# Patient Record
Sex: MALE | Race: BLACK OR AFRICAN AMERICAN | NOT HISPANIC OR LATINO | URBAN - NONMETROPOLITAN AREA
[De-identification: names, ages, dates, MRNs, and addresses within clinical notes are randomized per-mention and may not be internally consistent; named-entity substitution may affect disease eponyms.]

---

## 2019-12-18 NOTE — PATIENT DISCUSSION
Recommended artificial tears to use: 1 drop 4x a day in both eyes.  ed lubricate first thing in am as K tissue is dehydrated and possibly even mild K edema from sleep this delay of 15-20 mins in the am very common and more so as we age.

## 2020-05-13 ENCOUNTER — IMPORTED ENCOUNTER (OUTPATIENT)
Dept: URBAN - NONMETROPOLITAN AREA CLINIC 1 | Facility: CLINIC | Age: 45
End: 2020-05-13

## 2020-05-13 PROBLEM — H52.4: Noted: 2020-05-13

## 2020-05-13 PROCEDURE — S0620 ROUTINE OPHTHALMOLOGICAL EXA: HCPCS

## 2020-05-13 NOTE — PATIENT DISCUSSION
Myopia / Astigmatism / Clora Sames - Discussed diagnosis in detail with patient - New glasses Rx given today- Continue to monitor- RTC 1 year complete

## 2022-04-09 ASSESSMENT — TONOMETRY
OS_IOP_MMHG: 13
OD_IOP_MMHG: 14

## 2022-04-09 ASSESSMENT — VISUAL ACUITY
OD_CC: 20/20-
OS_CC: 20/25-2